# Patient Record
Sex: MALE | URBAN - METROPOLITAN AREA
[De-identification: names, ages, dates, MRNs, and addresses within clinical notes are randomized per-mention and may not be internally consistent; named-entity substitution may affect disease eponyms.]

---

## 2020-12-17 ENCOUNTER — NEW PATIENT (OUTPATIENT)
Dept: URBAN - METROPOLITAN AREA CLINIC 44 | Facility: CLINIC | Age: 50
End: 2020-12-17
Payer: COMMERCIAL

## 2020-12-17 PROCEDURE — 92004 COMPRE OPH EXAM NEW PT 1/>: CPT | Performed by: OPTOMETRIST

## 2020-12-17 ASSESSMENT — VISUAL ACUITY
OD: 20/20
OS: 20/20

## 2020-12-17 ASSESSMENT — INTRAOCULAR PRESSURE
OS: 16
OD: 16

## 2020-12-17 ASSESSMENT — KERATOMETRY
OD: 248.63
OS: 43.00

## 2022-01-05 ENCOUNTER — OFFICE VISIT (OUTPATIENT)
Dept: URBAN - METROPOLITAN AREA CLINIC 44 | Facility: CLINIC | Age: 52
End: 2022-01-05
Payer: COMMERCIAL

## 2022-01-05 DIAGNOSIS — H53.2 DIPLOPIA: ICD-10-CM

## 2022-01-05 DIAGNOSIS — H04.123 TEAR FILM INSUFFICIENCY OF BILATERAL LACRIMAL GLANDS: Primary | ICD-10-CM

## 2022-01-05 DIAGNOSIS — H52.4 PRESBYOPIA: ICD-10-CM

## 2022-01-05 PROCEDURE — 99214 OFFICE O/P EST MOD 30 MIN: CPT | Performed by: OPTOMETRIST

## 2022-01-05 ASSESSMENT — VISUAL ACUITY
OS: 20/20
OD: 20/20

## 2022-01-05 ASSESSMENT — KERATOMETRY
OS: 42.88
OD: 43.38

## 2022-01-05 ASSESSMENT — INTRAOCULAR PRESSURE
OD: 17
OS: 18

## 2022-01-05 NOTE — IMPRESSION/PLAN
Impression: Tear film insufficiency of bilateral lacrimal glands Plan: AT's 4x/day, but q1h when on computer for long periods of time. Start venlafaxine 37.5 mg-take daily for 2-3 days then start taking 2 capsules (75mg) daily  Use lorazepam as needed   F/u 2 weeks

## 2022-01-05 NOTE — IMPRESSION/PLAN
Impression: Diplopia: H53.2. Plan: Patient experienced intermittent vertical double vision when looking at computer screen Double vision lasts 20 seconds before returning to normal
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